# Patient Record
Sex: FEMALE | Race: WHITE
[De-identification: names, ages, dates, MRNs, and addresses within clinical notes are randomized per-mention and may not be internally consistent; named-entity substitution may affect disease eponyms.]

---

## 2018-01-04 ENCOUNTER — HOSPITAL ENCOUNTER (EMERGENCY)
Dept: HOSPITAL 17 - NEPE | Age: 33
Discharge: HOME | End: 2018-01-04
Payer: MEDICAID

## 2018-01-04 VITALS — DIASTOLIC BLOOD PRESSURE: 78 MMHG | TEMPERATURE: 97.8 F | SYSTOLIC BLOOD PRESSURE: 124 MMHG

## 2018-01-04 VITALS — HEIGHT: 63 IN | WEIGHT: 141.1 LBS | BODY MASS INDEX: 25 KG/M2

## 2018-01-04 VITALS
TEMPERATURE: 97.9 F | OXYGEN SATURATION: 100 % | DIASTOLIC BLOOD PRESSURE: 63 MMHG | RESPIRATION RATE: 16 BRPM | SYSTOLIC BLOOD PRESSURE: 129 MMHG | HEART RATE: 74 BPM

## 2018-01-04 DIAGNOSIS — R10.9: Primary | ICD-10-CM

## 2018-01-04 DIAGNOSIS — R11.0: ICD-10-CM

## 2018-01-04 LAB
COLOR UR: (no result)
GLUCOSE UR STRIP-MCNC: (no result) MG/DL
HGB UR QL STRIP: (no result)
KETONES UR STRIP-MCNC: (no result) MG/DL
MUCOUS THREADS #/AREA URNS LPF: (no result) /LPF
NITRITE UR QL STRIP: (no result)
SP GR UR STRIP: 1.01 (ref 1–1.03)
SQUAMOUS #/AREA URNS HPF: 1 /HPF (ref 0–5)
URINE LEUKOCYTE ESTERASE: (no result)

## 2018-01-04 PROCEDURE — 99284 EMERGENCY DEPT VISIT MOD MDM: CPT

## 2018-01-04 PROCEDURE — 74176 CT ABD & PELVIS W/O CONTRAST: CPT

## 2018-01-04 PROCEDURE — 84703 CHORIONIC GONADOTROPIN ASSAY: CPT

## 2018-01-04 PROCEDURE — 81001 URINALYSIS AUTO W/SCOPE: CPT

## 2018-01-04 NOTE — PD
HPI


Chief Complaint:  Flank/Kidney Pain


Time Seen by Provider:  07:24


Travel History


International Travel<30 days:  No


Contact w/Intl Traveler<30days:  No


Traveled to known affect area:  No





History of Present Illness


HPI


32-year-old female patient with history of UTI for the last week and a half, 

has had anthrax treatment including amoxicillin and Cipro which have not worked

, and states that her primary care doctor had prescribed Bactrim but she has 

not yet taken it.  She presents to the ER today because she is feeling worse, 

suprapubic discomfort, nausea, feels like is radiating to her left flank.  She 

denies any fevers, vomiting, diarrhea, or other symptoms.





Modifying Factors: None


Associated Signs & Symptoms: Suprapubic discomfort, nausea, left flank pain


Risk Factors: Recent UTIs





PFSH


Past Medical History


Diabetes:  No


Diminished Hearing:  No


Reproductive:  Yes (OVARIAN CYSTS)


Immunizations Current:  No


Pregnant?:  Not Pregnant


LMP:  12-9-17


:  6


Para:  3


Miscarriage:  1


:  1


Ovarian Cysts:  Yes


Dilation and Curettage (D&C):  Yes





Past Surgical History


Genitourinary Surgery:  Yes (4 ABORTIONS)


Gynecologic Surgery:  Yes (D&C-)





Social History


Alcohol Use:  No


Tobacco Use:  No


Substance Use:  No





Allergies-Medications


(Allergen,Severity, Reaction):  


Coded Allergies:  


     No Known Allergies (Verified  Adverse Reaction, Unknown, 18)


Reported Meds & Prescriptions





Reported Meds & Active Scripts


Active








Review of Systems


Except as stated in HPI:  all other systems reviewed are Neg





Physical Exam


Narrative


GENERAL: Well-developed young female patient currently in mild distress.  Awake 

and oriented 3.


SKIN: Focused skin assessment warm/dry.


HEAD: Atraumatic. Normocephalic. 


EYES: Pupils equal and round. No scleral icterus. No injection or drainage. 


ENT: No nasal bleeding or discharge.  Mucous membranes pink and moist.


NECK: Trachea midline. No JVD. 


CARDIOVASCULAR: Regular rate and rhythm.  No murmur appreciated.


RESPIRATORY: No accessory muscle use. Clear to auscultation. Breath sounds 

equal bilaterally. 


GASTROINTESTINAL: Abdomen soft, mild suprapubic tenderness without guarding or 

rebound, nondistended. Hepatic and splenic margins not palpable. 


BACK: No CVA tenderness. No rash.  No point tenderness on palpation of the 

spine.


MUSCULOSKELETAL: No obvious deformities. No clubbing.  No cyanosis.  No edema. 


NEUROLOGICAL: Awake and alert. No obvious cranial nerve deficits.  Motor 

grossly within normal limits. Normal speech.


PSYCHIATRIC: Appropriate mood and affect; insight and judgment normal.





Data


Data


Last Documented VS





Vital Signs








  Date Time  Temp Pulse Resp B/P (MAP) Pulse Ox O2 Delivery O2 Flow Rate FiO2


 


18 06:44 97.9 74 16 129/63 (85) 100   








Orders





 Orders


Urinalysis - C+S If Indicated (18 06:50)


Ed Urine Pregnancytest Poc (18 07:24)


Ct Abd/Pel W/O Iv Contrast (18 07:47)





Labs





Laboratory Tests








Test


  18


06:50


 


Urine Color LIGHT-YELLOW 


 


Urine Turbidity CLEAR 


 


Urine pH 5.0 


 


Urine Specific Gravity 1.012 


 


Urine Protein NEG mg/dL 


 


Urine Glucose (UA) NEG mg/dL 


 


Urine Ketones NEG mg/dL 


 


Urine Occult Blood NEG 


 


Urine Nitrite NEG 


 


Urine Bilirubin NEG 


 


Urine Urobilinogen


  LESS THAN 2.0


MG/DL


 


Urine Leukocyte Esterase NEG 


 


Urine WBC 1 /hpf 


 


Urine Squamous Epithelial


Cells 1 /hpf 


 


 


Urine Mucus FEW /lpf 


 


Microscopic Urinalysis Comment


  CULT NOT


INDICATED











MDM


Medical Decision Making


Medical Screen Exam Complete:  Yes


Emergency Medical Condition:  Yes


Medical Record Reviewed:  Yes


Interpretation(s)





Laboratory Tests








Test


  18


06:50


 


Urine Mucus FEW /lpf (OCC) 








Last 24 hours Impressions








Abdomen/Pelvis CT 18 7916 Signed





Impressions: 





 Service Date/Time:   08:30 - CONCLUSION: Chronic 





 changes of L4-5 facet on the left otherwisotherwise unremarkable. mio Marshall MD 








Differential Diagnosis


UTI versus pyelonephritis versus renal colic


Narrative Course


Abdomen is benign.  CAT scan did not show any signs of acute intra-abdominal 

processes or kidney stones.  UA did not show UTI.  At this point, I suspect 

this is more likely to be a muscular flank discomfort.  She does have some L4-5 

left facet joints findings with could be causing some irritation.  My plan 

would be to give her symptomatic relief or pain and follow-up with primary care 

doctor.  Return for any worsening in pain or new symptoms as needed.  The plan 

has been discussed with her and she states understanding.





Diagnosis





 Primary Impression:  


 Left flank pain


***Med/Other Pt SpecificInfo:  Prescription(s) given


Scripts


Tramadol (Tramadol) 50 Mg Tab


50 MG PO Q6H Y for PAIN, #15 TAB 0 Refills


   Prov: Noe Cadet MD         18


Disposition:  01 DISCHARGE HOME


Condition:  Stable











Noe Cadet MD 2018 07:27

## 2018-01-04 NOTE — RADRPT
EXAM DATE/TIME:  01/04/2018 08:30 

 

[facility] COMPARISON:     

No previous studies available for comparison.

 

 

INDICATIONS :     

Flank pain

                  

 

ORAL CONTRAST:      

No oral contrast ingested.

                  

 

RADIATION DOSE:     

5.42 CTDIvol (mGy) 

 

 

MEDICAL HISTORY :     

None  

 

SURGICAL HISTORY :      

None. 

 

ENCOUNTER:      

Initial

 

ACUITY:      

4 - 6 days

 

PAIN SCALE:      

7/10

 

LOCATION:       

Left flank 

 

TECHNIQUE:     

Volumetric scanning of the abdomen and pelvis was performed.  Using automated exposure control and ad
justment of the mA and/or kV according to patient size, radiation dose was kept as low as reasonably 
achievable to obtain optimal diagnostic quality images.  DICOM format image data is available electro
nically for review and comparison.  

 

FINDINGS:     

CT Abdomen: The liver, spleen, pancreas, kidneys, adrenals are unremarkable. There is no evidence for
 any appreciable pathological adenopathy, free fluid, or bowel obstruction.  There is no evidence for
 any stones in the kidneys or the course of the ureters on either side. There is no hydronephrosis.

 

CT pelvis: There is no evidence for mass, abscess formation, or any significant adenopathy within the
 pelvis. There is facet arthrosis at L4-5 on the left side and what appears to be a tiny avulsed frac
ture of superior articular process of L5 on the left side chronic in nature.

 

CONCLUSION:     Chronic changes of L4-5 facet on the left otherwisotherwise unremarkable.

mio Marshall MD on January 04, 2018 at 9:01           

Board Certified Radiologist.

 This report was verified electronically.

## 2018-02-17 ENCOUNTER — HOSPITAL ENCOUNTER (EMERGENCY)
Dept: HOSPITAL 17 - NEPD | Age: 33
Discharge: HOME | End: 2018-02-17
Payer: SELF-PAY

## 2018-02-17 VITALS — WEIGHT: 132.28 LBS | HEIGHT: 63 IN | BODY MASS INDEX: 23.44 KG/M2

## 2018-02-17 VITALS
TEMPERATURE: 98.4 F | DIASTOLIC BLOOD PRESSURE: 62 MMHG | SYSTOLIC BLOOD PRESSURE: 116 MMHG | HEART RATE: 83 BPM | RESPIRATION RATE: 14 BRPM | OXYGEN SATURATION: 98 %

## 2018-02-17 DIAGNOSIS — N83.202: ICD-10-CM

## 2018-02-17 DIAGNOSIS — O03.4: Primary | ICD-10-CM

## 2018-02-17 DIAGNOSIS — O34.81: ICD-10-CM

## 2018-02-17 LAB
ALBUMIN SERPL-MCNC: 3.7 GM/DL (ref 3.4–5)
ALP SERPL-CCNC: 53 U/L (ref 45–117)
ALT SERPL-CCNC: 19 U/L (ref 10–53)
AMORPHOUS SEDIMENT, URINE: (no result)
AST SERPL-CCNC: 24 U/L (ref 15–37)
BASOPHILS # BLD AUTO: 0.1 TH/MM3 (ref 0–0.2)
BASOPHILS NFR BLD: 1 % (ref 0–2)
BILIRUB SERPL-MCNC: 0.5 MG/DL (ref 0.2–1)
BUN SERPL-MCNC: 15 MG/DL (ref 7–18)
CALCIUM SERPL-MCNC: 8.4 MG/DL (ref 8.5–10.1)
CHLORIDE SERPL-SCNC: 105 MEQ/L (ref 98–107)
COLOR UR: YELLOW
CREAT SERPL-MCNC: 0.74 MG/DL (ref 0.5–1)
EOSINOPHIL # BLD: 0.2 TH/MM3 (ref 0–0.4)
EOSINOPHIL NFR BLD: 1.8 % (ref 0–4)
ERYTHROCYTE [DISTWIDTH] IN BLOOD BY AUTOMATED COUNT: 14.2 % (ref 11.6–17.2)
GFR SERPLBLD BASED ON 1.73 SQ M-ARVRAT: 90 ML/MIN (ref 89–?)
GLUCOSE SERPL-MCNC: 85 MG/DL (ref 74–106)
GLUCOSE UR STRIP-MCNC: (no result) MG/DL
HCO3 BLD-SCNC: 28.7 MEQ/L (ref 21–32)
HCT VFR BLD CALC: 40.4 % (ref 35–46)
HGB BLD-MCNC: 14 GM/DL (ref 11.6–15.3)
HGB UR QL STRIP: (no result)
KETONES UR STRIP-MCNC: (no result) MG/DL
LYMPHOCYTES # BLD AUTO: 1.9 TH/MM3 (ref 1–4.8)
LYMPHOCYTES NFR BLD AUTO: 18.6 % (ref 9–44)
MCH RBC QN AUTO: 32.7 PG (ref 27–34)
MCHC RBC AUTO-ENTMCNC: 34.8 % (ref 32–36)
MCV RBC AUTO: 93.9 FL (ref 80–100)
MONOCYTE #: 0.9 TH/MM3 (ref 0–0.9)
MONOCYTES NFR BLD: 9.1 % (ref 0–8)
NEUTROPHILS # BLD AUTO: 7.2 TH/MM3 (ref 1.8–7.7)
NEUTROPHILS NFR BLD AUTO: 69.5 % (ref 16–70)
NITRITE UR QL STRIP: (no result)
PLATELET # BLD: 273 TH/MM3 (ref 150–450)
PMV BLD AUTO: 9.1 FL (ref 7–11)
PROT SERPL-MCNC: 7.4 GM/DL (ref 6.4–8.2)
RBC # BLD AUTO: 4.3 MIL/MM3 (ref 4–5.3)
SODIUM SERPL-SCNC: 138 MEQ/L (ref 136–145)
SP GR UR STRIP: 1.02 (ref 1–1.03)
SQUAMOUS #/AREA URNS HPF: 1 /HPF (ref 0–5)
URINE LEUKOCYTE ESTERASE: (no result)
WBC # BLD AUTO: 10.4 TH/MM3 (ref 4–11)

## 2018-02-17 PROCEDURE — 76817 TRANSVAGINAL US OBSTETRIC: CPT

## 2018-02-17 PROCEDURE — 85025 COMPLETE CBC W/AUTO DIFF WBC: CPT

## 2018-02-17 PROCEDURE — 83690 ASSAY OF LIPASE: CPT

## 2018-02-17 PROCEDURE — 80053 COMPREHEN METABOLIC PANEL: CPT

## 2018-02-17 PROCEDURE — 76700 US EXAM ABDOM COMPLETE: CPT

## 2018-02-17 PROCEDURE — 81001 URINALYSIS AUTO W/SCOPE: CPT

## 2018-02-17 PROCEDURE — 84702 CHORIONIC GONADOTROPIN TEST: CPT

## 2018-02-17 NOTE — RADRPT
EXAM DATE/TIME:  2018 13:03 

 

[facility] COMPARISON:     

No previous studies available for comparison.

        

 

 

INDICATIONS :     

Pelvic pain. , 5 weeks ago.

                     

 

LAB(S):     

 

Beta-hC

                     

 

MEDICAL HISTORY :           

Ovarian cysts. Recent pill , 5 weeks ago. 

 

SURGICAL HISTORY :          

D&C. Tummy tuck. 

 

ENCOUNTER:     

Initial

 

ACUITY:     

3 days

 

PAIN SCORE:     

4/10

 

LOCATION:     

Bilateral pelvis 

                     

MEASUREMENTS:     

 

UTERUS:                                  

9.8 x 5.5 x 4.6  cm

 

ENDOMETRIAL STRIPE:      

8 mm

 

RIGHT OVARY:                      

3.4 x 2.7 x 2.4 cm cm

 

LEFT OVARY:                         

4.5 x 3.7 x 3.8 cm cm

 

FREE FLUID:                           

No  

 

CROWN RUMP LENGTH:     

Not visualized. =  WKS  DAYS

 

FHR:                                         

Not visualized. BPM

 

FINDINGS:     

 

UTERUS:     

No definite gestational sac demonstrated. There is a complex hypoechoic structure in the fundus measu
ring 1.8 x 1.1 x 1.3 cm. Small cyst near the lower uterine segment measuring 9 x 7 x 7 mm.

 

RIGHT OVARY:     

Simple 1.5 x 1.5 x 1.3 cm cyst. Otherwise, unremarkable.

 

LEFT OVARY:     

Slightly complex anechoic avascular cyst containing a thin septation measuring 4.0 x 3.1 x 3.3 cm.

 

MISCELLANEOUS:     

No free fluid.  

 

CONCLUSION:     

1. No intrauterine gestational sac demonstrated. 

2. There is a complex hypoechoic structure near the fundus measuring 1.8 x 1.1 x 1.3 cm which may ref
lect retained products or focal hematoma.

3. Small bilateral ovarian cysts, slightly complex on the left but within simple cyst criteria.

 

 

 Nitesh Adan MD on 2018 at 14:15           

Board Certified Radiologist.

 This report was verified electronically.

## 2018-02-17 NOTE — PD
HPI


Chief Complaint:  Abdominal Pain


Time Seen by Provider:  12:21


Travel History


International Travel<30 days:  No


Contact w/Intl Traveler<30days:  No


Traveled to known affect area:  No





History of Present Illness


HPI


32-year-old woman presents emergency frequent lower abdominal pain.  She 

reports that she had a medical induced  about 4 weeks ago, when she was 

about 5 weeks pregnant.  She states since that time she has not felt well.  She 

went back a week ago for routine follow-up.  She was at the time having lower 

abdominal pain and cramping.  She states that she was told that she was 

pregnant again with what they thought was a "new pregnancy" they recommended 

that she return for repeat ultrasound.  She had more pain and cramping.  She 

had bleeding on and off.  Following the medical  she had some bleeding 

for 4 days or so but denies passing tissue or clots.  She does endorse 

subjective chills, and some nausea.  She is  6 para 4, 0, 1, for one 

previous  in the more remote past, and the pregnancy now.





History


Past Medical History


Medical History:  Denies Significant Hx


LMP:  18


:  6


Para:  3


Dilation and Curettage (D&C):  Yes





Social History


Alcohol Use:  No


Tobacco Use:  No





Allergies-Medications


(Allergen,Severity, Reaction):  


Coded Allergies:  


     No Known Allergies (Verified  Adverse Reaction, Unknown, 18)


Reported Meds & Prescriptions





Reported Meds & Active Scripts


Active


No Active Prescriptions or Reported Medications    








Review of Systems


Except as stated in HPI:  all other systems reviewed are Neg





Physical Exam


Narrative


GENERAL: Well-appearing 32-year-old woman, no acute distress.


SKIN: Focused skin assessment warm/dry.


NECK: Trachea midline. No JVD. 


CARDIOVASCULAR: Regular rate and rhythm.  No murmur appreciated.


RESPIRATORY: No accessory muscle use. Clear to auscultation. Breath sounds 

equal bilaterally. 


GASTROINTESTINAL: Abdomen soft, non-tender, nondistended. Hepatic and splenic 

margins not palpable. 


MUSCULOSKELETAL: No obvious deformities. No clubbing.  No cyanosis.  No edema. 


NEUROLOGICAL: Awake and alert. No obvious cranial nerve deficits.  Motor 

grossly within normal limits. Normal speech.





Data


Data


Last Documented VS





Vital Signs








  Date Time  Temp Pulse Resp B/P (MAP) Pulse Ox O2 Delivery O2 Flow Rate FiO2


 


18 11:26 98.4 83 14 116/62 (80) 98   








Orders





 Orders


Complete Blood Count With Diff (18 11:36)


Comprehensive Metabolic Panel (18 11:36)


Urinalysis - C+S If Indicated (18 11:36)


Ed Urine Pregnancytest Poc (18 11:36)


Iv Access Insert/Monitor (18 11:36)


Oxygen Administration (18 11:36)


Oximetry (18 11:36)


Lipase (18 11:36)


Beta Hcg (Quant/Titer) (18 11:36)


Us Pelvis (Ques Pr/Ect)W Trans (18 )





Labs





Laboratory Tests








Test


  18


11:40 18


11:47


 


Urine Color YELLOW  


 


Urine Turbidity CLOUDY  


 


Urine pH 8.5  


 


Urine Specific Gravity 1.021  


 


Urine Protein TRACE mg/dL  


 


Urine Glucose (UA) NEG mg/dL  


 


Urine Ketones NEG mg/dL  


 


Urine Occult Blood NEG  


 


Urine Nitrite NEG  


 


Urine Bilirubin NEG  


 


Urine Urobilinogen


  LESS THAN 2.0


MG/DL 


 


 


Urine Leukocyte Esterase NEG  


 


Urine Squamous Epithelial


Cells 1 /hpf 


  


 


 


Urine Amorphous Sediment MOD  


 


Microscopic Urinalysis Comment


  CULT NOT


INDICATED 


 


 


White Blood Count  10.4 TH/MM3 


 


Red Blood Count  4.30 MIL/MM3 


 


Hemoglobin  14.0 GM/DL 


 


Hematocrit  40.4 % 


 


Mean Corpuscular Volume  93.9 FL 


 


Mean Corpuscular Hemoglobin  32.7 PG 


 


Mean Corpuscular Hemoglobin


Concent 


  34.8 % 


 


 


Red Cell Distribution Width  14.2 % 


 


Platelet Count  273 TH/MM3 


 


Mean Platelet Volume  9.1 FL 


 


Neutrophils (%) (Auto)  69.5 % 


 


Lymphocytes (%) (Auto)  18.6 % 


 


Monocytes (%) (Auto)  9.1 % 


 


Eosinophils (%) (Auto)  1.8 % 


 


Basophils (%) (Auto)  1.0 % 


 


Neutrophils # (Auto)  7.2 TH/MM3 


 


Lymphocytes # (Auto)  1.9 TH/MM3 


 


Monocytes # (Auto)  0.9 TH/MM3 


 


Eosinophils # (Auto)  0.2 TH/MM3 


 


Basophils # (Auto)  0.1 TH/MM3 


 


CBC Comment  DIFF FINAL 


 


Differential Comment   


 


Blood Urea Nitrogen  15 MG/DL 


 


Creatinine  0.74 MG/DL 


 


Random Glucose  85 MG/DL 


 


Total Protein  7.4 GM/DL 


 


Albumin  3.7 GM/DL 


 


Calcium Level  8.4 MG/DL 


 


Alkaline Phosphatase  53 U/L 


 


Aspartate Amino Transf


(AST/SGOT) 


  24 U/L 


 


 


Alanine Aminotransferase


(ALT/SGPT) 


  19 U/L 


 


 


Total Bilirubin  0.5 MG/DL 


 


Sodium Level  138 MEQ/L 


 


Potassium Level  4.3 MEQ/L 


 


Chloride Level  105 MEQ/L 


 


Carbon Dioxide Level  28.7 MEQ/L 


 


Anion Gap  4 MEQ/L 


 


Estimat Glomerular Filtration


Rate 


  90 ML/MIN 


 


 


Lipase  168 U/L 


 


Human Chorionic Gonadotropin,


Quant 


  86 MIU/ML 


 











[facility]


Medical Decision Making


Medical Screen Exam Complete:  Yes


Emergency Medical Condition:  Yes


Interpretation(s)


LABS:


CBC is unremarkable.


CMP is unremarkable.


HCG 86


Lipase 168


UA is unremarkable.





Pelvic ultrasound: Possible retained products of conception.


Differential Diagnosis


Retained products of conception, early pregnancy, incomplete AB, infection, 

other


Narrative Course


Medical decision making





32-year-old woman presents with lower abdominal pain, some subjective chills, 

nausea, following a medical .  I do not believe that she got pregnant 

again following her .  She may have some retained products of 

conception.  Will recheck ultrasound, hCG, pelvic exam, reassess.





FINAL: Spoke with OB hospitalist.  R recommend close outpatient follow-up.  

Agrees with antibiotics.





Diagnosis





 Primary Impression:  


 Retained products of conception following 





***Additional Instructions:  


Take antibiotics as prescribed.





Take Naprosyn as needed for pain.





Follow-up with your OB doctor in the next 1 week.





Return to the emergency department for any new or worsening symptoms.


***Med/Other Pt SpecificInfo:  Prescription(s) given


Scripts


Naproxen (Naproxen) 500 Mg Tab


500 MG PO BID for 7 Days, #14 TAB 0 Refills


   Prov: Magan Slater MD         18 


Amoxicillin-Clavulanate (Augmentin) 500-125 mg Tab


500 MG PO BID for Infection for 7 Days, TAB 0 Refills


   Prov: Magan Slater MD         18


Disposition:  01 DISCHARGE HOME


Condition:  Stable











Magan Slater MD 2018 13:14

## 2018-04-04 NOTE — PD
HPI


Chief Complaint:  Cold / Flu Symptoms


Time Seen by Provider:  08:37


Travel History


International Travel<30 days:  No


Contact w/Intl Traveler<30days:  No


Traveled to known affect area:  No





History of Present Illness


HPI


The patient was seen and examined in the presence of the nurse.  This patient 

complains of cough and congestion and fever and chest discomfort.  She has 

central chest pain when she coughs.  Symptoms are not exertional.  She had a 

temperature of 101 at home.  She has some nasal congestion.  No vomiting or 

diarrhea or abdominal pain or urinary complaints.  Symptom severity is 

moderate.  Duration 3 days.  No alleviating factors.  Coughing aggravates her 

chest discomfort.





PFSH


Past Medical History


Anemia:  Yes


Heart Rhythm Problems:  Yes


Diabetes:  No


Diminished Hearing:  No


Reproductive:  Yes (OVARIAN CYSTS)


Immunizations Current:  No


Pregnant?:  Unknown


LMP:  2018


:  6


Para:  3


Miscarriage:  1


:  4


Ovarian Cysts:  Yes


Dilation and Curettage (D&C):  Yes





Past Surgical History


Genitourinary Surgery:  Yes (4 ABORTIONS)


Gynecologic Surgery:  Yes (D&C-)


Other Surgery:  Yes (tummy tuck )





Social History


Alcohol Use:  No


Tobacco Use:  No


Substance Use:  No





Allergies-Medications


(Allergen,Severity, Reaction):  


Coded Allergies:  


     No Known Allergies (Verified  Adverse Reaction, Unknown, 18)


Reported Meds & Prescriptions





Reported Meds & Active Scripts


Active


Reported


Ibuprofen 400 Mg Tab 800 Mg PO Q6H PRN


Theraflu Nt Severe Cld-Cgh Pkt (Diphenhydra/Phenyleph/Acetamin) 25 Mg-10 Mg-650 

Mg Powd.pack  PO Q6HR PRN


Tylenol (Acetaminophen) 325 Mg Tab 400 Mg PO Q4H PRN








Review of Systems


General / Constitutional:  Positive: Fever


Eyes:  No: Visual changes


HENT:  Positive: Rhinorrhea, Congestion, No: Headaches


Cardiovascular:  Positive: Chest Pain or Discomfort


Respiratory:  Positive: Cough, No: Shortness of Breath


Gastrointestinal:  No: Abdominal Pain


Genitourinary:  No: Dysuria


Musculoskeletal:  No: Pain


Skin:  No Rash


Neurologic:  No: Weakness


Psychiatric:  No: Depression


Endocrine:  No: Polydipsia


Hematologic/Lymphatic:  No: Easy Bruising





Physical Exam


Narrative


GENERAL: Well-nourished, well-developed patient with cough and congestion .


SKIN: Focused skin assessment reveals no rash and nodules. Skin is Warm and dry.


HEAD: Atraumatic. Normocephalic. 


EYES: Pupils equal and round. No scleral icterus. No injection or drainage. 


ENT: No nasal bleeding or discharge.  Mucous membranes pink and moist.


NECK: Trachea midline. No JVD. 


CARDIOVASCULAR: Regular rate and rhythm.  No murmur appreciated.


RESPIRATORY: No accessory muscle use. Clear to auscultation. Breath sounds 

equal bilaterally. 


GASTROINTESTINAL: Abdomen soft, non-tender, nondistended. Hepatic and splenic 

margins not palpable. 


MUSCULOSKELETAL: No obvious deformities. No clubbing.  No cyanosis.  No edema. 


NEUROLOGICAL: Awake and alert. No obvious cranial nerve deficits.  Motor 

grossly within normal limits. Normal speech.


PSYCHIATRIC: Appropriate mood and affect; insight and judgment normal.





Data


Data


Last Documented VS





Vital Signs








  Date Time  Temp Pulse Resp B/P (MAP) Pulse Ox O2 Delivery O2 Flow Rate FiO2


 


18 09:36 100.1 93 13 109/67 (81) 97   








Orders





 Orders


Electrocardiogram (18 )


Cardiac Monitor / Telemetry CHANNING.Q8H (18 08:51)


Chest, Single Ap (18 )


Influenzae A/B Antigen (18 09:00)








MDM


Medical Decision Making


Medical Screen Exam Complete:  Yes


Emergency Medical Condition:  Yes


Medical Record Reviewed:  Yes


Differential Diagnosis


Pneumonia, bronchitis, costochondritis


Narrative Course


I have reviewed the patient's electronic medical record.  Patient was seen here 

for retained products of conception 2 months ago





I reviewed her EKG which is normal


I reviewed her chest x-ray shows no consolidation


Extended cardiac monitoring shows sinus rhythm without ectopy


Influenza swab is positive for influenza A





Supportive care discussed.  Stable for outpatient follow-up





Diagnosis





 Primary Impression:  


 Influenza A


 Additional Impression:  


 Musculoskeletal chest pain





***Additional Instructions:  


The patient was advised to follow up with their physician and return if they 

worsen.


***Med/Other Pt SpecificInfo:  Other


Disposition:   DISCHARGE HOME


Condition:  Stable











Fran Cabrera MD 2018 09:00

## 2018-04-04 NOTE — RADRPT
EXAM DATE/TIME:  2018 09:26 

 

[facility] COMPARISON:     

No previous studies available for comparison.

 

                     

INDICATIONS :     

Chest pain and short of breath.

                     

 

MEDICAL HISTORY :            

Ovarian cysts. Recent pill , 5 weeks ago.   

 

SURGICAL HISTORY :        

D&C. Tummy tuck.

 

ENCOUNTER:     

Initial                                        

 

ACUITY:     

2 days      

 

PAIN SCORE:     

2/10

 

LOCATION:     

Bilateral chest 

 

FINDINGS:     

A single view of the chest demonstrates the lungs to be symmetrically aerated without evidence of mas
s, infiltrate or effusion.  The cardiomediastinal contours are unremarkable.  Osseous structures are 
intact.

 

CONCLUSION:     

1. No acute cardiopulmonary findings.

 

 

 

 Florian Pak MD on 2018 at 10:08           

Board Certified Radiologist.

 This report was verified electronically.

## 2018-04-04 NOTE — EKG
Date Performed: 04/04/2018       Time Performed: 09:08:35

 

PTAGE:      32 years

 

EKG:      Sinus rhythm 

 

 NORMAL ECG 

 

NO PREVIOUS TRACING            

 

DOCTOR:   Magan Teran  Interpretating Date/Time  04/04/2018 13:02:35